# Patient Record
Sex: FEMALE | Race: BLACK OR AFRICAN AMERICAN | NOT HISPANIC OR LATINO | Employment: FULL TIME | URBAN - METROPOLITAN AREA
[De-identification: names, ages, dates, MRNs, and addresses within clinical notes are randomized per-mention and may not be internally consistent; named-entity substitution may affect disease eponyms.]

---

## 2017-05-26 ENCOUNTER — HISTORICAL (OUTPATIENT)
Dept: RADIOLOGY | Facility: HOSPITAL | Age: 47
End: 2017-05-26

## 2017-06-13 ENCOUNTER — HISTORICAL (OUTPATIENT)
Dept: RADIOLOGY | Facility: HOSPITAL | Age: 47
End: 2017-06-13

## 2018-05-14 ENCOUNTER — HISTORICAL (OUTPATIENT)
Dept: RADIOLOGY | Facility: HOSPITAL | Age: 48
End: 2018-05-14

## 2018-09-24 ENCOUNTER — HISTORICAL (OUTPATIENT)
Dept: RADIOLOGY | Facility: HOSPITAL | Age: 48
End: 2018-09-24

## 2019-04-08 ENCOUNTER — HISTORICAL (OUTPATIENT)
Dept: RADIOLOGY | Facility: HOSPITAL | Age: 49
End: 2019-04-08

## 2019-05-27 ENCOUNTER — HISTORICAL (OUTPATIENT)
Dept: RADIOLOGY | Facility: HOSPITAL | Age: 49
End: 2019-05-27

## 2020-04-24 ENCOUNTER — HOSPITAL ENCOUNTER (OUTPATIENT)
Dept: MEDSURG UNIT | Facility: HOSPITAL | Age: 50
End: 2020-04-25
Attending: FAMILY MEDICINE | Admitting: FAMILY MEDICINE

## 2020-04-24 LAB
ABS NEUT (OLG): 4.4 X10(3)/MCL (ref 1.5–6.9)
ALBUMIN SERPL-MCNC: 3.8 GM/DL (ref 3.4–5)
ALBUMIN/GLOB SERPL: 0.9 RATIO
ALP SERPL-CCNC: 126 UNIT/L (ref 30–113)
ALT SERPL-CCNC: 26 UNIT/L (ref 10–45)
AST SERPL-CCNC: 15 UNIT/L (ref 15–37)
BASOPHILS # BLD AUTO: 0 X10(3)/MCL (ref 0–0.1)
BASOPHILS NFR BLD AUTO: 1 % (ref 0–1)
BILIRUB SERPL-MCNC: 0.5 MG/DL (ref 0.1–0.9)
BILIRUBIN DIRECT+TOT PNL SERPL-MCNC: 0.1 MG/DL (ref 0–0.3)
BILIRUBIN DIRECT+TOT PNL SERPL-MCNC: 0.4 MG/DL
BUN SERPL-MCNC: 16 MG/DL (ref 10–20)
CALCIUM SERPL-MCNC: 9.2 MG/DL (ref 8–10.5)
CHLORIDE SERPL-SCNC: 105 MMOL/L (ref 100–108)
CK MB SERPL-MCNC: 1.1 NG/ML (ref 0–3.6)
CK MB SERPL-MCNC: 1.6 NG/ML (ref 0–3.6)
CK SERPL-CCNC: 106 UNIT/L (ref 39–225)
CK SERPL-CCNC: 80 UNIT/L (ref 39–225)
CO2 SERPL-SCNC: 29 MMOL/L (ref 21–35)
CREAT SERPL-MCNC: 1.4 MG/DL (ref 0.7–1.3)
EOSINOPHIL # BLD AUTO: 0.2 X10(3)/MCL (ref 0–0.6)
EOSINOPHIL NFR BLD AUTO: 2 % (ref 0–5)
ERYTHROCYTE [DISTWIDTH] IN BLOOD BY AUTOMATED COUNT: 15 % (ref 11.5–17)
EST. AVERAGE GLUCOSE BLD GHB EST-MCNC: 192 MG/DL
GLOBULIN SER-MCNC: 4.1 GM/DL
GLUCOSE SERPL-MCNC: 233 MG/DL (ref 75–116)
HBA1C MFR BLD: 8.3 % (ref 4.8–6)
HCT VFR BLD AUTO: 42.5 % (ref 36–48)
HGB BLD-MCNC: 14.1 GM/DL (ref 12–16)
IMM GRANULOCYTES # BLD AUTO: 0.03 10*3/UL (ref 0–0.02)
IMM GRANULOCYTES NFR BLD AUTO: 0.4 % (ref 0–0.43)
LYMPHOCYTES # BLD AUTO: 2 X10(3)/MCL (ref 0.5–4.1)
LYMPHOCYTES NFR BLD AUTO: 27 % (ref 15–40)
MCH RBC QN AUTO: 28 PG (ref 27–34)
MCHC RBC AUTO-ENTMCNC: 33 GM/DL (ref 31–36)
MCV RBC AUTO: 85 FL (ref 80–99)
MONOCYTES # BLD AUTO: 0.6 X10(3)/MCL (ref 0–1.1)
MONOCYTES NFR BLD AUTO: 8 % (ref 4–12)
NEUTROPHILS # BLD AUTO: 4.4 X10(3)/MCL (ref 1.5–6.9)
NEUTROPHILS NFR BLD AUTO: 61 % (ref 43–75)
PLATELET # BLD AUTO: 278 X10(3)/MCL (ref 140–400)
PMV BLD AUTO: 11.9 FL (ref 6.8–10)
POTASSIUM SERPL-SCNC: 3.8 MMOL/L (ref 3.6–5.2)
PROT SERPL-MCNC: 7.9 GM/DL (ref 6.4–8.2)
RBC # BLD AUTO: 5 X10(6)/MCL (ref 4.2–5.4)
SODIUM SERPL-SCNC: 142 MMOL/L (ref 135–145)
TROPONIN I SERPL-MCNC: <0.017 NG/ML (ref 0–0.06)
TROPONIN I SERPL-MCNC: <0.017 NG/ML (ref 0–0.06)
WBC # SPEC AUTO: 7.3 X10(3)/MCL (ref 4.5–11.5)

## 2020-04-25 LAB
CK MB SERPL-MCNC: 0.9 NG/ML (ref 0–3.6)
CK SERPL-CCNC: 78 UNIT/L (ref 39–225)
TROPONIN I SERPL-MCNC: <0.017 NG/ML (ref 0–0.06)

## 2020-06-04 ENCOUNTER — HISTORICAL (OUTPATIENT)
Dept: CARDIOLOGY | Facility: HOSPITAL | Age: 50
End: 2020-06-04

## 2020-06-18 ENCOUNTER — HISTORICAL (OUTPATIENT)
Dept: ADMINISTRATIVE | Facility: HOSPITAL | Age: 50
End: 2020-06-18

## 2020-06-18 LAB
ABS NEUT (OLG): 4.43 X10(3)/MCL (ref 2.1–9.2)
ALBUMIN SERPL-MCNC: 4 GM/DL (ref 3.5–5)
ALBUMIN/GLOB SERPL: 1.1 RATIO (ref 1.1–2)
ALP SERPL-CCNC: 120 UNIT/L (ref 40–150)
ALT SERPL-CCNC: 18 UNIT/L (ref 0–55)
AMYLASE SERPL-CCNC: 33 UNIT/L (ref 25–125)
AST SERPL-CCNC: 13 UNIT/L (ref 5–34)
BASOPHILS # BLD AUTO: 0 X10(3)/MCL (ref 0–0.2)
BASOPHILS NFR BLD AUTO: 1 %
BILIRUB SERPL-MCNC: 0.4 MG/DL
BILIRUBIN DIRECT+TOT PNL SERPL-MCNC: 0.2 MG/DL (ref 0–0.5)
BILIRUBIN DIRECT+TOT PNL SERPL-MCNC: 0.2 MG/DL (ref 0–0.8)
BUN SERPL-MCNC: 16 MG/DL (ref 9.8–20.1)
CALCIUM SERPL-MCNC: 9.6 MG/DL (ref 8.4–10.2)
CHLORIDE SERPL-SCNC: 108 MMOL/L (ref 98–107)
CO2 SERPL-SCNC: 27 MMOL/L (ref 22–29)
CREAT SERPL-MCNC: 0.85 MG/DL (ref 0.55–1.02)
EOSINOPHIL # BLD AUTO: 0.2 X10(3)/MCL (ref 0–0.9)
EOSINOPHIL NFR BLD AUTO: 2 %
ERYTHROCYTE [DISTWIDTH] IN BLOOD BY AUTOMATED COUNT: 15 % (ref 11.5–17)
GLOBULIN SER-MCNC: 3.5 GM/DL (ref 2.4–3.5)
GLUCOSE SERPL-MCNC: 152 MG/DL (ref 74–100)
HCT VFR BLD AUTO: 46.3 % (ref 37–47)
HGB BLD-MCNC: 14.7 GM/DL (ref 12–16)
LIPASE SERPL-CCNC: 20 U/L
LYMPHOCYTES # BLD AUTO: 1.8 X10(3)/MCL (ref 0.6–4.6)
LYMPHOCYTES NFR BLD AUTO: 26 %
MCH RBC QN AUTO: 28.2 PG (ref 27–31)
MCHC RBC AUTO-ENTMCNC: 31.7 GM/DL (ref 33–36)
MCV RBC AUTO: 88.7 FL (ref 80–94)
MONOCYTES # BLD AUTO: 0.6 X10(3)/MCL (ref 0.1–1.3)
MONOCYTES NFR BLD AUTO: 8 %
NEUTROPHILS # BLD AUTO: 4.43 X10(3)/MCL (ref 2.1–9.2)
NEUTROPHILS NFR BLD AUTO: 63 %
PLATELET # BLD AUTO: 286 X10(3)/MCL (ref 130–400)
PMV BLD AUTO: 12.4 FL (ref 9.4–12.4)
POTASSIUM SERPL-SCNC: 4.6 MMOL/L (ref 3.5–5.1)
PROT SERPL-MCNC: 7.5 GM/DL (ref 6.4–8.3)
RBC # BLD AUTO: 5.22 X10(6)/MCL (ref 4.2–5.4)
SODIUM SERPL-SCNC: 144 MMOL/L (ref 136–145)
TSH SERPL-ACNC: 1.2 UIU/ML (ref 0.35–4.94)
WBC # SPEC AUTO: 7.1 X10(3)/MCL (ref 4.5–11.5)

## 2020-06-23 ENCOUNTER — HISTORICAL (OUTPATIENT)
Dept: RADIOLOGY | Facility: HOSPITAL | Age: 50
End: 2020-06-23

## 2020-06-24 ENCOUNTER — HISTORICAL (OUTPATIENT)
Dept: RADIOLOGY | Facility: HOSPITAL | Age: 50
End: 2020-06-24

## 2020-06-24 ENCOUNTER — HISTORICAL (OUTPATIENT)
Dept: ENDOSCOPY | Facility: HOSPITAL | Age: 50
End: 2020-06-24

## 2020-07-22 ENCOUNTER — HISTORICAL (OUTPATIENT)
Dept: ENDOSCOPY | Facility: HOSPITAL | Age: 50
End: 2020-07-22

## 2021-09-21 ENCOUNTER — HISTORICAL (OUTPATIENT)
Dept: RADIOLOGY | Facility: HOSPITAL | Age: 51
End: 2021-09-21

## 2021-10-15 ENCOUNTER — HISTORICAL (OUTPATIENT)
Dept: RADIOLOGY | Facility: HOSPITAL | Age: 51
End: 2021-10-15

## 2022-04-30 NOTE — DISCHARGE SUMMARY
DISCHARGE TIME:  1526    ADMISSION DIAGNOSIS:  Chest pain.    DISCHARGE DIAGNOSES:  Chest pain, R07.9, with resolution; diabetes mellitus, E11.9; hypertension, I10; and obesity E66.9.     Covering for Dr. Connie Mejía, attending physician, __________.     49-year-old black female with history of chest pain which she said came on for the first, not brought on by exertion, but went up into her left neck and she felt some pressure.  She has many risk factors including diabetes mellitus, morbid obesity, hypertension.  Dr. Mejía placed her in for rule out cardiac enzymes, all 3 of which came back negative, less than 0.017.  Her metabolic workup was negative except for some BUN and creatinine that were mildly elevated at __________ and 1.40.  Of note, the patient had been on Farxiga a couple days prior to, to which Dr. Mejía and the patient may have attributed some volume loss.   Nonetheless, the patient felt better after 24 hours of monitoring.  Her EKGs from yesterday appeared to be normal, normal sinus rhythm.  No ST-segment changes or elevations.  On exam, she was clear to auscultation today.  She was in no distress.  Regular rate and rhythm.  No rubs, gallops, or murmurs.  Of note, I could not palpate a good pulse as well as I would like to on the right wrist.  We will consider that in the workup of potential thoracic aortic issues if this continues.  However, she is pain free today.  Consider also GI in terms esophagitis, reflux and also gallbladder issues.     Because of her workup and her relatively more sparse symptoms today, I feel comfortable sending her home with instructions of taking nitroglycerin should she get the chest pain again __________.     I will defer to Dr. Mejía because I think she has significant risk factors to get an outpatient cardiac workup.  I will defer to Dr. Mejía for her choice __________ patient for referral.     I have told the patient to call me back upon discharge if  she has any worsening fevers, chills, chest pain, shortness of breath, dyspepsia as we can work through the differential further over the weekend if it becomes acute again.     She understands.     She is discharged.  Her vitals are stable.  Her blood pressure has been good.  I did not send her home on metoprolol because her heart rates are in the lower 50s and she was feeling well.  Her blood pressures are well controlled in the one-teens to 120s, but that may be a consideration to put her back on Monday if this is hypertension induced.     The patient is discharged in stable condition.  She will follow up with Dr. Mejía next week.        TANO/RAY   DD: 04/25/2020 1658   DT: 04/25/2020 1718  Job # [id number]    cc: Dr. Connie Parr III, M.D.

## 2022-05-02 NOTE — HISTORICAL [facility]
This is a historical note converted from [facility]. Formatting and pictures may have been removed.  Please reference Wei for original formatting and attached multimedia. Chief Complaint  outpatient colonoscopy  History of Present Illness  50 year old here for outpatient colonoscopy exam.  Review of Systems  Constitutional:?no fever, fatigue  Eye:?no vision loss, eye redness, drainage, or pain  ENMT:?no sore throat, ear pain, sinus pain/congestion, nasal congestion/drainage  Respiratory:?no cough, no shortness of breath  Cardiovascular:?no chest pain, no palpitations  Gastrointestinal:?no nausea, vomiting, or diarrhea. No abdominal pain  Genitourinary:?no dysuria, no urinary frequency  Hema/Lymph:?no abnormal bruising or bleeding  Endocrine:?no heat or cold intolerance  Musculoskeletal:?no muscle or joint pain, no joint swelling  Integumentary:?no skin rash  Neurologic: no headache  Physical Exam  Vitals & Measurements  HR:?82(Monitored)? RR:?18? BP:?117/64? SpO2:?97%? WT:?136?kg? BMI:?58.86?  General:?obese  female in no acute distress  Eye: EOMI, clear conjunctiva, eyelids normal  HENT:?TMs/ear canals clear, oropharynx without erythema/exudate  Neck: full range of motion  Respiratory:?clear to auscultation bilaterally  Cardiovascular:?regular rate and rhythm without murmurs  Gastrointestinal:?soft, non-tender, non-distended with normal bowel sounds  Integumentary: no rashes  Neurologic: cranial nerves intact, grossly  Assessment/Plan  Discussed the risks and benefits of the procedure in detail, including but not limited to bleeding, perforation, infection, missed polyps and cancer and rarely Death.? Patient understands and wishes to proceed.?   Problem List/Past Medical History  Ongoing  Chest pain  DM - Diabetes mellitus  HTN - Hypertension  Obesity  Historical  No qualifying data  Procedure/Surgical History  Biopsy Gastrointestinal (06/24/2020)  Esophagogastroduodenoscopy  (06/24/2020)  Esophagogastroduodenoscopy, flexible, transoral; with biopsy, single or multiple (06/24/2020)  Excision of Stomach, Pylorus, Via Natural or Artificial Opening Endoscopic, Diagnostic (06/24/2020)  Appendectomy;  Total abdominal hysterectomy (corpus and cervix), with or without removal of tube(s), with or without removal of ovary(s); with colpo-urethrocystopexy (eg, [id number]-Ssqcjn, Engel)   Medications  Inpatient  Buffered Lidocaine 1% - 1mL syringe, 5 mg= 0.5 mL, ID, As Directed, PRN  Plasmalyte 1,000 mL, 1000 mL, IV  Home  aspirin 81 mg oral tablet, CHEWABLE, 81 mg= 1 tab(s), Oral, Daily  Farxiga 5 mg oral tablet, 5 mg= 1 tab(s), Oral, qAM  glimepiride 4 mg oral tablet, 4 mg= 1 tab(s), Oral, BID  Januvia 100 mg oral tablet, 100 mg= 1 tab(s), Oral, Daily  Lasix 20 mg oral tablet, 20 mg= 1 tab(s), Oral, Daily, PRN  lisinopril 40 mg oral tablet, 40 mg= 1 tab(s), Oral, Daily  metoprolol succinate 25 mg oral tablet, extended release, 25 mg= 1 tab(s), Oral, Daily  nitroglycerin 0.4 mg sublingual TAB, 0.4 mg= 1 tab(s), SL, q5min, PRN  nitroglycerin 0.4 mg sublingual TAB, 0.4 mg= 1 tab(s), SL, q15min, PRN,? ?Not taking  oxybutynin chloride 10 mg tablet extended release 24hr, Daily  Pantoprazole 40 mg ORAL EC-Tablet, 40 mg= 1 tab(s), Oral, Daily  Vitamin D 1,000 Units Tab, 1 tab(s), Oral, Daily  Allergies  No Known Allergies  Social History  Abuse/Neglect  No, 07/22/2020  No, 04/24/2020  Alcohol  Never, 11/07/2017  Home/Environment  Living situation: Home/Independent., 11/07/2017  Substance Use  Never, 11/07/2017  Tobacco  Never (less than 100 in lifetime), No, 07/22/2020  Never (less than 100 in lifetime), No, 06/24/2020  Never smoker, 11/07/2017

## 2022-09-27 ENCOUNTER — HOSPITAL ENCOUNTER (OUTPATIENT)
Dept: RADIOLOGY | Facility: HOSPITAL | Age: 52
Discharge: HOME | End: 2022-09-27
Attending: FAMILY MEDICINE
Payer: COMMERCIAL

## 2022-09-27 DIAGNOSIS — Z12.31 ENCOUNTER FOR SCREENING MAMMOGRAM FOR MALIGNANT NEOPLASM OF BREAST: ICD-10-CM

## 2022-09-27 PROCEDURE — 77063 MAMMO DIGITAL SCREENING BILAT WITH TOMO: ICD-10-PCS | Mod: 26,,, | Performed by: STUDENT IN AN ORGANIZED HEALTH CARE EDUCATION/TRAINING PROGRAM

## 2022-09-27 PROCEDURE — 77067 MAMMO DIGITAL SCREENING BILAT WITH TOMO: ICD-10-PCS | Mod: 26,,, | Performed by: STUDENT IN AN ORGANIZED HEALTH CARE EDUCATION/TRAINING PROGRAM

## 2022-09-27 PROCEDURE — 77067 SCR MAMMO BI INCL CAD: CPT | Mod: TC

## 2022-09-27 PROCEDURE — 77067 SCR MAMMO BI INCL CAD: CPT | Mod: 26,,, | Performed by: STUDENT IN AN ORGANIZED HEALTH CARE EDUCATION/TRAINING PROGRAM

## 2022-09-27 PROCEDURE — 77063 BREAST TOMOSYNTHESIS BI: CPT | Mod: 26,,, | Performed by: STUDENT IN AN ORGANIZED HEALTH CARE EDUCATION/TRAINING PROGRAM

## 2022-10-07 PROBLEM — I10 HYPERTENSION: Status: ACTIVE | Noted: 2022-10-07

## 2022-10-07 PROBLEM — E11.9 DIABETES MELLITUS: Status: ACTIVE | Noted: 2020-07-31

## 2022-10-07 PROBLEM — E66.9 OBESITY: Status: ACTIVE | Noted: 2022-10-07

## 2022-11-30 ENCOUNTER — OFFICE VISIT (OUTPATIENT)
Dept: HEMATOLOGY/ONCOLOGY | Facility: CLINIC | Age: 52
End: 2022-11-30
Payer: COMMERCIAL

## 2022-11-30 DIAGNOSIS — Z80.3 FAMILY HISTORY OF BREAST CANCER: Primary | ICD-10-CM

## 2022-11-30 PROCEDURE — 4010F ACE/ARB THERAPY RXD/TAKEN: CPT | Mod: CPTII,95,, | Performed by: NURSE PRACTITIONER

## 2022-11-30 PROCEDURE — 99203 PR OFFICE/OUTPT VISIT, NEW, LEVL III, 30-44 MIN: ICD-10-PCS | Mod: 95,,, | Performed by: NURSE PRACTITIONER

## 2022-11-30 PROCEDURE — 4010F PR ACE/ARB THEARPY RXD/TAKEN: ICD-10-PCS | Mod: CPTII,95,, | Performed by: NURSE PRACTITIONER

## 2022-11-30 PROCEDURE — 99203 OFFICE O/P NEW LOW 30 MIN: CPT | Mod: 95,,, | Performed by: NURSE PRACTITIONER

## 2022-11-30 NOTE — PROGRESS NOTES
REFERRING PHYSICIAN: Dr. Cari Olviera    Subjective:       Patient ID: Taisha Hall is a 52 y.o. female.    Chief Complaint: No personal history of cancer but a family history of cancer. Patient presented via Telemedicine Visit (audio and video) today for risk assessment, genetic counseling, and consideration for genetic testing.     HPI  Past Medical History:   Diagnosis Date    Essential (primary) hypertension     Type 2 diabetes mellitus without complications       No past surgical history on file.     Review of patient's allergies indicates:  No Known Allergies     Review of Systems   Constitutional:  Negative for appetite change and unexpected weight change.   HENT:  Negative for mouth sores.    Eyes:  Negative for visual disturbance.   Respiratory:  Negative for cough and shortness of breath.    Cardiovascular:  Negative for chest pain.   Gastrointestinal:  Negative for abdominal pain and diarrhea.   Genitourinary:  Negative for frequency.   Musculoskeletal:  Negative for back pain.   Integumentary:  Negative for rash.   Neurological:  Negative for headaches.   Hematological:  Negative for adenopathy.   Psychiatric/Behavioral:  The patient is not nervous/anxious.            Problem List Items Addressed This Visit    None  Oncology History    No history exists.          Family History   Problem Relation Age of Onset    Breast cancer Mother 66    Cancer Maternal Grandmother     Breast cancer Paternal Grandmother     Thyroid cancer Other       Assessment:   Risk Assessment:  According to the National Comprehensive Cancer Network's (NCCN, Version 2.2021) personal and family history criteria, Ms. Hall is not appropriate for genetic testing. NCCN recommends genetic testing for individuals meeting the following criteria:  1. Known mutation within the family  2.. Personal history of breast cancer:      a Diagnosed at age 45y or younger;      b. Diagnosed at age 45-50y with     1) unknown or limited  family history or    2) a second breast cancer diagnosed at any age    3) >1 close blood relative with breast, ovarian, pancreatic, or metastatic or high grade (Gerry >7) or intraductal prostate cancer at any age   c. Diagnosed at age 60 or younger with a triple negative breast cancer    d. Diagnosed at any age:       1) with [employer] ancestry       2) with at least one close blood relative* with breast cancer < 50 or ovarian, pancreatic, or metastatic or intraductal prostate cancer at any age;    3) and there are >3 diagnoses of breast cancer in patient and/or close blood relatives    4) with male breast cancer  3. Personal history of ovarian (including fallopian tube cancer or peritoneal cancer) carcinoma at any age  4. Personal history of high-grade (Gerry >7)  prostate cancer:    1) with [employer] ancestry       2) with at least one close blood relative* with breast cancer < 50 or ovarian, pancreatic, or metastatic or intraductal prostate cancer at any age;    3) >2 close relatives with breast or prostate cancer (any grade) at any age  5. A mutation identified on tumor genomic testing that has clinical implications if identified in germline  6. To aid in systemic therapy decision-making  7. Family history only:    a. First or second degree relative that meets the above criteria.    b. Individual that does not meet criteria but has a probability of >5% of a BRCA1/2 mutation based on probability models (Tyrer-Cuzick, BRCAPro, Pennil)  8. Consider testing for:    1) bilateral breast cancer with 1st diagnosed between 50y and 65y    2) Unaffected [employer] individual    3) individual that does not meet other criteria by with a  >2/5%-5% of a BRCA1/2 mutation based on probability models (Tyrer-Cuzick, BRCAPro, Pennil)  9. Personal or family history of three or more of the following:    Breast, pancreatic cancer, prostate cancer (Alstead score >7 or metastatic), melanoma, sarcoma,  adrenocortical carcinoma, brain tumors, leukemia, diffuse            gastric cancer, colon cancer, endometrial cancer, thyroid cancer, kidney cancer, dermatologic manifestations and/or macrocephaly, hamartomatous polyps of GI       tract.    *NCCN defines blood relative as first- (parents, siblings and children), second- (grandparents, aunts, uncles, nieces and nephews, grandchildren and half-siblings), and third degree-relatives (great-grandparents, great-aunts, great uncles, great grandchildren and first cousins) on same side of family.    SUMMARY:  This patient does not meet NCCN criteria for genetic testing. I have asked that she contact me if she discovers additional cancer family history and appropriateness of testing will be reevaluated.    The patient location is: home    Visit type: audiovisual    Face to Face time with patient: 20 minutes  30 minutes of total time spent on the encounter, which includes face to face time and non-face to face time preparing to see the patient (eg, review of tests), Obtaining and/or reviewing separately obtained history, Documenting clinical information in the electronic or other health record, Independently interpreting results (not separately reported) and communicating results to the patient/family/caregiver, or Care coordination (not separately reported).         Each patient to whom he or she provides medical services by telemedicine is:  (1) informed of the relationship between the physician and patient and the respective role of any other health care provider with respect to management of the patient; and (2) notified that he or she may decline to receive medical services by telemedicine and may withdraw from such care at any time.      YURI BEAVERS, PhD

## 2023-03-28 DIAGNOSIS — Z15.01 GENETIC SUSCEPTIBILITY TO MALIGNANT NEOPLASM OF BREAST: ICD-10-CM

## 2023-03-28 DIAGNOSIS — Z12.31 ENCOUNTER FOR SCREENING MAMMOGRAM FOR MALIGNANT NEOPLASM OF BREAST: Primary | ICD-10-CM

## 2023-07-07 DIAGNOSIS — Z12.31 SCREENING MAMMOGRAM FOR HIGH-RISK PATIENT: Primary | ICD-10-CM

## 2023-10-03 ENCOUNTER — HOSPITAL ENCOUNTER (OUTPATIENT)
Dept: RADIOLOGY | Facility: HOSPITAL | Age: 53
Discharge: HOME | End: 2023-10-03
Attending: FAMILY MEDICINE
Payer: COMMERCIAL

## 2023-10-03 DIAGNOSIS — Z12.31 SCREENING MAMMOGRAM FOR HIGH-RISK PATIENT: ICD-10-CM

## 2023-10-03 PROCEDURE — 77067 SCR MAMMO BI INCL CAD: CPT | Mod: 26,,, | Performed by: STUDENT IN AN ORGANIZED HEALTH CARE EDUCATION/TRAINING PROGRAM

## 2023-10-03 PROCEDURE — 77067 SCR MAMMO BI INCL CAD: CPT | Mod: TC

## 2023-10-03 PROCEDURE — 77067 MAMMO DIGITAL SCREENING BILAT: ICD-10-PCS | Mod: 26,,, | Performed by: STUDENT IN AN ORGANIZED HEALTH CARE EDUCATION/TRAINING PROGRAM

## 2024-09-05 DIAGNOSIS — Z12.31 ENCOUNTER FOR SCREENING MAMMOGRAM FOR BREAST CANCER: Primary | ICD-10-CM

## 2024-10-18 ENCOUNTER — HOSPITAL ENCOUNTER (OUTPATIENT)
Dept: RADIOLOGY | Facility: HOSPITAL | Age: 54
Discharge: HOME | End: 2024-10-18
Attending: FAMILY MEDICINE
Payer: COMMERCIAL

## 2024-10-18 DIAGNOSIS — Z12.31 ENCOUNTER FOR SCREENING MAMMOGRAM FOR BREAST CANCER: ICD-10-CM

## 2024-10-18 PROCEDURE — 77063 BREAST TOMOSYNTHESIS BI: CPT | Mod: 26,,, | Performed by: STUDENT IN AN ORGANIZED HEALTH CARE EDUCATION/TRAINING PROGRAM

## 2024-10-18 PROCEDURE — 77063 BREAST TOMOSYNTHESIS BI: CPT | Mod: TC

## 2024-10-18 PROCEDURE — 77067 SCR MAMMO BI INCL CAD: CPT | Mod: 26,,, | Performed by: STUDENT IN AN ORGANIZED HEALTH CARE EDUCATION/TRAINING PROGRAM

## 2024-10-18 PROCEDURE — 77067 SCR MAMMO BI INCL CAD: CPT | Mod: TC

## 2024-10-28 DIAGNOSIS — Z91.89 AT HIGH RISK FOR BREAST CANCER: Primary | ICD-10-CM

## 2024-11-20 NOTE — PROGRESS NOTES
[facility] Breast Surg  Breast Surgical Oncology  New Patient Office Visit - H&P      Referring Provider: Dr. Connie Mejía  PCP: Connie Mejía MD   Care Team:  OBGYN: No data on file.    Chief Complaint:   Chief Complaint   Patient presents with    Genetic Evaluation     Patient reports no breast related concerns        Subjective:     HPI:  Taisha Hall is a 54 y.o. female who presents on 11/21/2024 for evaluation of risk for breast cancer. Based on the Tyrer-Cuzick Breast Cancer Risk Model, her lifetime risk is calculated to be 22.3%.    Patient is doing well today. She currently denies any breast issues including rashes, redness, pain, swelling, nipple discharge, or new lumps/masses.  She recently underwent screening mammogram in October which resulted as negative.  She has not previously undergone any breast biopsies or breast surgeries.  She has not undergone genetic testing in the past.  Patient is currently postmenopausal and had a hysterectomy with BSO at age 41.  Patient states she performs regular self-breast exams.  Patient does have a history of type 2 diabetes, but she states her diabetes is well controlled at this time. She reports she has been trying to eat a healthier diet and live a healthier lifestyle. She is not able to do any exercise currently due to injury to her knee.  She does not smoke or drink alcohol.  She currently works in community [employer].     Of note, patient does have a significant family history of breast cancer in her mother who was diagnosed at the age of 67.  Patient states her mother's breast cancer was HER2 positive.  Patient has a paternal grandmother who was diagnosed with breast cancer at the age of 60.  She has a maternal great aunt who was diagnosed with breast cancer at unknown age.  She also has a maternal grandmother who was diagnosed with esophageal cancer at the age of 65.  Patient states none of these family members were  genetically tested.    Imaging:   10/21/2024 SC MG - There is no mammographic evidence of malignancy. BI-RADS: 1 Negative     Pathology:   none    OB/GYN History:  Age at Menarche Onset: 11  Menopausal Status: postmenopausal, LMP: No LMP recorded. Patient has had a hysterectomy.  Hysterectomy/Oophorectomy: hysterectomy with BSO, at age 41  Hormonal birth control (duration):  None  Pregnancy History: None  Age at first live birth: None  Hormone Replacement Therapy: N/A, none    Other:  MG breast density: BIRADS B   Prior thoracic RT: none  Genetic testing:  None  Ashkenazi Sabianism descent: No    Family History:  Family History   Problem Relation Name Age of Onset    Breast cancer Mother Rosana 66    Cancer Mother Rosana     Diabetes Father Panda     Hypertension Father Panda     Breast cancer Maternal Grandmother Ginger         age unknown    Breast cancer Paternal Grandmother Lilli         age unknown    Thyroid cancer Other          Maternal Great Aunt        Patient History:  Past Medical History:   Diagnosis Date    Essential (primary) hypertension     Infertility, female     Type 2 diabetes mellitus without complications     Urinary incontinence        Past Surgical History:   Procedure Laterality Date    APPENDECTOMY  2006    HYSTERECTOMY  2011       Social History     Socioeconomic History    Marital status: Single   Tobacco Use    Smoking status: Never    Smokeless tobacco: Never   Substance and Sexual Activity    Alcohol use: Never    Drug use: Never    Sexual activity: Not Currently     Partners: Male     Birth control/protection: None       There is no immunization history for the selected administration types on file for this patient.    Medications/Allergies:    Current Outpatient Medications:     dapagliflozin (FARXIGA) 10 mg tablet, Farxiga 10 mg tablet  TAKE 1 TABLET BY MOUTH ONCE DAILY IN THE MORNING, Disp: , Rfl:     glimepiride (AMARYL) 4 MG tablet, glimepiride 4 mg tablet, Disp: , Rfl:      ketoconazole (NIZORAL) 2 % cream, APPLY ONE APPLICATION ON THE SKIN TWICE A DAY, Disp: , Rfl:     lisinopriL (PRINIVIL,ZESTRIL) 40 MG tablet, lisinopril 40 mg tablet, Disp: , Rfl:     meloxicam (MOBIC) 15 MG tablet, Take 15 mg by mouth., Disp: , Rfl:     MOUNJARO 15 mg/0.5 mL PnIj, , Disp: , Rfl:     dapagliflozin propanediol (FARXIGA) 10 mg tablet, Take 1 tablet by mouth every morning., Disp: , Rfl:      Review of patient's allergies indicates:  No Known Allergies    Review of Systems:  All pertinent history mentioned in HPI.     Objective:     Vitals:  Vitals:    11/21/24 0930   BP: 119/80   BP Location: Right arm   Patient Position: Sitting   Pulse: 67   Resp: 18   Temp: 98 °F (36.7 °C)   TempSrc: Oral   SpO2: 99%   Weight: 126.1 kg (278 lb)   Height: 5' (1.524 m)       Body mass index is 54.29 kg/m².     Physical Exam:  General: The patient is awake, alert and oriented times three. The patient is well nourished and in no acute distress.  Neck: There is no evidence of palpable cervical, supraclavicular or axillary adenopathy. The neck is supple. The thyroid is not enlarged.  Musculoskeletal: The patient has a normal range of motion of her bilateral upper extremities.  Chest: Examination of the chest wall fails to reveal any obvious abnormalities.  The lungs are clear to auscultation bilaterally without rales, rhonchi, or wheezing.  Cardiovascular: The heart has a regular rate and rhythm without murmurs, gallops or rubs.  Breast:   Right:  Examination of right breast fails to reveal any dominant masses or areas of significant focal nodularity. The nipple is everted without evidence of discharge. There is no skin dimpling with movement of the pectoralis. There is no significant skin changes overlying the breast.   Left:  Examination of the left breast fails to reveal any dominant masses or areas of significant focal nodularity. The nipple is everted without evidence of discharge. There is no skin dimpling with  movement of the pectoralis. There are no significant skin changes overlying the breast.  Abdomen: The abdomen is soft, flat, nontender and nondistended with no palpable masses or organomegaly.  Integumentary: no rashes or skin lesions present  Neurologic: cranial nerves intact, no signs of peripheral neurological deficit, motor/sensory function intact    Assessment:     Patient Active Problem List   Diagnosis    Diabetes mellitus    Hypertension    Obesity        Taisha was seen today for genetic evaluation.    Diagnoses and all orders for this visit:    At high risk for breast cancer  -     Ambulatory referral/consult to Breast Surgery  -     Mammo Digital Screening Bilat w/ Dain; Future  -     US BREAST SCREENING BILATERAL; Future    Screening mammogram for breast cancer  -     Mammo Digital Screening Bilat w/ Dain; Future  -     US BREAST SCREENING BILATERAL; Future    Family history of breast cancer  -     Mammo Digital Screening Bilat w/ Dain; Future  -     US BREAST SCREENING BILATERAL; Future          --------------------------------------------------------------------------------------------------------------  After the initial clinical evaluation nearly 30 minutes were on counseling the patients regarding the options for management. Risk reduction strategies were discussed.     1. Lifestyle factors: As with other types of cancer, studies continue to show that various lifestyle factors may contribute to the development of breast cancer.     Weight: Recent studies have shown that postmenopausal women who are overweight or obese have an increased risk of breast cancer. These women also have a higher risk of having the cancer come back after treatment.     Physical activity: Decreased physical activity is associated with an increased risk of developing breast cancer and a higher risk of having the cancer come back after treatment. Regular physical activity may protect against breast cancer by helping women  "maintain a healthy body weight, lowering hormone levels, or causing changes in a womens metabolism or immune factors.     Alcohol: Current research suggests that having more than 1 to 2 alcoholic drinks, including beer, wine, and spirits, per day raises the risk of breast cancer, as well as the risk of having the cancer come back after treatment.     Food: There is no reliable research that confirms that eating or avoiding specific foods reduces the risk of developing breast cancer or having the cancer come back after treatment. However, eating more fruits and vegetables and fewer animal fats is linked with many health benefits.     2. Prevention:  Surgery to lower cancer risk: For women with BRCA1 or BRCA2 genetic mutations, which substantially increase the risk of breast cancer, preventive removal of the breasts may be considered. The procedure, called a prophylactic mastectomy, appears to reduce the risk of developing breast cancer by at least 95%. Women with these mutations should also consider the preventive removal of the ovaries and fallopian tubes, called a prophylactic salpingo-oophorectomy. This procedure can reduce the risk of developing ovarian cancer, as well as breast cancer, by stopping the ovaries from making estrogen.      Drugs to lower cancer risk (Chemoprevention): Women who have a higher than usual risk of developing breast cancer may consider certain drugs that may help prevent breast cancer. This approach may also be called "chemoprevention." For breast cancer, this is the use of hormone-blocking drugs to reduce cancer risk. The drugs, tamoxifen (Soltamox) and raloxifene (Evista), are approved by the U.S. Food and Drug Administration (FDA) to lower breast cancer risk. These drugs are called selective estrogen receptor modulators (SERMs) and are not chemotherapy. A SERM is a medication that blocks estrogen receptors in some tissues and not others. Both women who have gone through menopause and " those who have not may take tamoxifen. Raloxifene is only approved for women who have gone through menopause. Each drug also has different side effects.     Aromatase inhibitors (AIs) have also been shown to lower breast cancer risk. AIs are a type of hormone-blocking treatment that reduces the amount of estrogen in a woman's body by stopping tissues and organs other than the ovaries from producing estrogen. They can only be used by women who have gone through menopause. However, no AIs have been approved by the FDA for lowering breast cancer risk in women who do not have the disease.     3. Surveillance: Women with a known genetic mutation should follow screening guidelines per the NCCN guidelines. Women with no known genetic mutation  and found to be at greater than 20 percent average lifetime risk of breast cancer are recommended for the following screening recommendations:     Clinical Breast exam: Every 6-12 months starting at age found to be at increased risk by risk model     Mammogram: Per NCCN guidelines, recommended every year starting 10 years younger than the youngest breast cancer case in the family (but not before age 30). May consider beginning breast MRIs at age 30 per ACR guidelines if desired by patient or other clinical considerations. May also consider getting a baseline MG at time of initial high risk consultation, if not already obtained.     Breast MRI: Per NCCN guidelines, recommended every year starting 10 years younger than the youngest breast cancer case in the family (but not before age 25). May consider beginning breast MRIs at age 30 per ACR guidelines if desired by patient or other clinical considerations. If patient has a first-degree relative with a BRCA1/2 gene mutation, youre encouraged to get genetic counseling and/or testing before getting MRI as part of screening (for those who do not wish to have genetic testing, MRI is recommended). Breast MRI in combination with mammography  is better than mammography alone at finding breast cancer in certain women at higher than average risk.     --------------------------------------------------------------------------------------------------------------  Plan:     1. Lifestyle - Healthy lifestyle guidelines were reviewed. She was encouraged to engage in regular exercise, maintain a healthy body weight, and avoid excessive alcohol consumption. Healthy nutritional guidelines were also discussed. Self-breast examination was reviewed with the patient in detail and she was encouraged to perform this on a monthly basis.    2. Surveillance - She currently does not desire high risk screening with alternating SCR MGs and breast MRIs every 6 months. Instead, we will proceed surveilling with annual screening mammogram and bilateral supplemental ultrasound. Patient is claustrophobic, and would rather hold off on breast MRI at this time.  Next screening mammogram with ultrasound will be due in October 2025.  RTC in 1 year.    3. Prevention - We had a brief discussion/education about indications for preventative mastectomy or chemoprevention.  These methods are not recommended to her at this time.    4. Genetics - According to NCCN guidelines, she does not currently meet criteria for genetic testing. Will continue to monitor.    5. Other routine screening exams:   -  Recommend annual follow up with PCP.   -  Recommend annual follow up with GYN for pap smears/gynecologic exams and CBE.   -  GI: Recommend start colorectal cancer screening at age 45 in average risk individuals. This can be done either with a sensitive test that looks for signs of cancer in a persons stool (a stool-based test), or with an exam that looks at the colon and rectum (a visual exam). Patient's at an increased risk for CRC (ex. Personal or family history of CRC, certain types of polyps, genetic disorders, IBD, or hx of abdominal radiation) should start screening prior to age 45.       All  of her questions were answered. She was advised to call if she develops any questions or concerns.    Faith Ramirez PA-C     --------------------------------------------------------------------------------------------------------------  Total time on the date of the visit ranged from 60-74 mins ([id number]). Total time includes both face-to-face and non-face-to-face time personally spent by myself on the day of the visit.    Non-face-to-face time included:  _X_ preparing to see the patient such as reviewing the patient record  _X_ obtaining and reviewing separately obtained history  _X_ independently interpreting results  _X_ documenting clinical information in electronic health record.    Face-to-face time included:  _X_ performing an appropriate history and examination  _X_ communicating results to the patient  _X_ counseling and educating the patient  __ ordering appropriate medications  _x_ ordering appropriate tests  _X_ ordering appropriate procedures (including follow-up)  _X_ answering any questions the patient had    Total Time spent on date of visit: 60 minutes

## 2024-11-21 ENCOUNTER — OFFICE VISIT (OUTPATIENT)
Dept: SURGERY | Facility: CLINIC | Age: 54
End: 2024-11-21
Payer: COMMERCIAL

## 2024-11-21 VITALS
RESPIRATION RATE: 18 BRPM | SYSTOLIC BLOOD PRESSURE: 119 MMHG | DIASTOLIC BLOOD PRESSURE: 80 MMHG | HEART RATE: 67 BPM | WEIGHT: 278 LBS | OXYGEN SATURATION: 99 % | TEMPERATURE: 98 F | HEIGHT: 60 IN | BODY MASS INDEX: 54.58 KG/M2

## 2024-11-21 DIAGNOSIS — Z80.3 FAMILY HISTORY OF BREAST CANCER: ICD-10-CM

## 2024-11-21 DIAGNOSIS — Z12.31 SCREENING MAMMOGRAM FOR BREAST CANCER: ICD-10-CM

## 2024-11-21 DIAGNOSIS — Z91.89 AT HIGH RISK FOR BREAST CANCER: Primary | ICD-10-CM

## 2024-11-21 PROCEDURE — 99205 OFFICE O/P NEW HI 60 MIN: CPT | Mod: S$GLB,,,

## 2024-11-21 PROCEDURE — 4010F ACE/ARB THERAPY RXD/TAKEN: CPT | Mod: CPTII,S$GLB,,

## 2024-11-21 PROCEDURE — 1160F RVW MEDS BY RX/DR IN RCRD: CPT | Mod: CPTII,S$GLB,,

## 2024-11-21 PROCEDURE — 3008F BODY MASS INDEX DOCD: CPT | Mod: CPTII,S$GLB,,

## 2024-11-21 PROCEDURE — 3074F SYST BP LT 130 MM HG: CPT | Mod: CPTII,S$GLB,,

## 2024-11-21 PROCEDURE — 99999 PR PBB SHADOW E&M-EST. PATIENT-LVL V: CPT | Mod: PBBFAC,,,

## 2024-11-21 PROCEDURE — 3079F DIAST BP 80-89 MM HG: CPT | Mod: CPTII,S$GLB,,

## 2024-11-21 PROCEDURE — 1159F MED LIST DOCD IN RCRD: CPT | Mod: CPTII,S$GLB,,

## 2024-11-21 RX ORDER — TIRZEPATIDE 15 MG/.5ML
INJECTION, SOLUTION SUBCUTANEOUS
COMMUNITY
Start: 2024-01-08

## 2024-11-21 RX ORDER — MELOXICAM 15 MG/1
15 TABLET ORAL
COMMUNITY
Start: 2024-11-15

## 2024-11-21 RX ORDER — DAPAGLIFLOZIN 10 MG/1
1 TABLET, FILM COATED ORAL EVERY MORNING
COMMUNITY

## 2024-11-21 RX ORDER — KETOCONAZOLE 20 MG/G
CREAM TOPICAL
COMMUNITY